# Patient Record
(demographics unavailable — no encounter records)

---

## 2025-04-22 NOTE — HISTORY OF PRESENT ILLNESS
[de-identified] : Initial visit. He presents for what he reports is eustachian tube dysfunction. He begins his history by reporting that in 2022 he was exposed to 8 or 9 hours of very loud music.  He reports that he developed decrease in perceived hearing.  He reports that he had a hearing test at that time which was normal.  The problem finally resolved although he was left with tinnitus.  3 weeks ago he was exposed to loud noise for 15 minutes. He then developed once again the perception of decreased hearing. Was evaluated by an otolaryngologist told that it was safe to fly to [place].  I reviewed an audiogram at that time which showed normal hearing speech discrimination scores and tympanograms.  When he was in [place] he once again felt that his hearing had diminished.  He was seen by an otolaryngologist who sent him for a CAT scan and did not test his hearing. And told him that he had eustachian tube dysfunction giving him decreased hearing. We further told him that he was a candidate for eustachian tube dilation.  He flew home from [place] without a problem. At this point he reports that his hearing is essentially 90% back to normal. - - [FreeTextEntry1] : -  04/22/2025 - He reports he is not feeling better. He wakes up with a clogged right ear. Denies history of bruxism. This is significantly bothersome to his quality of life.  No tinnitus.

## 2025-04-22 NOTE — ASSESSMENT
[FreeTextEntry1] : Had an extensive conversation with the patient that I do not think this is related to his eustachian tube because he has a normal ear exam, normal tympanograms, normal CT scan, and ETD does not come from acoustic trauma and no issues when he flew from [place]. Again I reiterated that he does not need a eustachian tube balloon dilation. We reviewed his CT scan again where he was told there was "fluid in his ear" and my impression is a retention cyst in the right maxillary sinus. Again discussed with him this has no relation to his ear symptoms.   I think this is trauma induced from the noise exposure he had about 5 weeks ago. I believe his symptoms will resolve spontaneously but we discussed a short course of Prednisone in hopes it will calm down some cochlear inflammation from the noise trauma.   the audiogram was ordered by me and interpreted by me today and the results are as follows-  Normal symmetric pure-tone hearing, speech discrimination scores with type A tympanograms.

## 2025-06-04 NOTE — ASSESSMENT
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 Bang reviewed - previous two notes by Brianne Cadena audiology reviewed - audio from 3/26/25 reviewed - audio from 4/22/25 personally reviewed and interpreted - normal hearing bilaterally - patient tried oral steroids already with no improvement - offered R IT steroid injection - patient will return for injection

## 2025-06-04 NOTE — HISTORY OF PRESENT ILLNESS
[de-identified] : 38M who presents with R possible cochlear hydrops since 2022. He had acoustic trauma for 8-9 hours in 2022 resulting in bilateral tinnitus and R aural fullness. He started wearing earplugs everywhere. He has been fine for the past 3 years. He had an exacerbation in March 2025. He saw multiple ENTs and was treated by [facility] with oral steroids. He is still having R aural fullness and it is driving him crazy.

## 2025-06-04 NOTE — DATA REVIEWED
[de-identified] : Audiogram personally reviewed and interpreted and my findings are as follows (4/22/25): Right: SRT 5dB; %; Type A tymp; normal Left: SRT 5dB; %; Type A tymp; normal

## 2025-06-26 NOTE — HISTORY OF PRESENT ILLNESS
[de-identified] : 38M who presents with R possible cochlear hydrops since 2022. He had acoustic trauma for 8-9 hours in 2022 resulting in bilateral tinnitus and R aural fullness. He started wearing earplugs everywhere. He has been fine for the past 3 years. He had an exacerbation in March 2025. He saw multiple ENTs and was treated by [facility] with oral steroids. He is still having R aural fullness and it is driving him crazy.   6/16/25: Went to a loud restaurant 1 week ago and now his left ear is feeling blocked. His right ear is not as bad as when he saw me last but still feels full.   6/26/25: S/p R IT injection on 6/16/25 but still with fluctuating right aural fullness. The left ear was fluctuating as well but is stable today.

## 2025-06-26 NOTE — ASSESSMENT
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 Bang reviewed - previous two notes by Brianne Cadena audiology reviewed - audio from 3/26/25 reviewed - audio from 4/22/25 personally reviewed and interpreted - normal hearing bilaterally - patient tried oral steroids already with no improvement - s/p R IT steroid injection 6/16/25 with no improvement (myringotomy with needle before injection did not improve aural fullness which helps r/o eustachian tube dysfunction) - the patient has had no issues with pressure problems on recent flight either - start dyazide - sent to pharmacy - The potential side effects of dyazide were discussed at length with the patient which include but are not limited to: frequent urination, low blood pressure, dehydration, muscle cramps, fatigue, dizziness, constipation, headaches, erectile dysfunction, blurred vision - discussed with the patient that a diuretic is taken daily to help prevent the occurrence of vertigo attacks - instructed the patient to drink plenty of water (at least 6-8 cups daily) while on a diuretic - continue meniere's diet - second opinion from Dr. Romano as the patient is frustrated that he is not improving despite having tried the meniere's diet, oral steroids, and IT injection  L cochlear hydrops - 1 acute illness - new onset, continue to monitor - stable today

## 2025-06-26 NOTE — DATA REVIEWED
[de-identified] : Audiogram personally reviewed and interpreted and my findings are as follows (4/22/25): Right: SRT 5dB; %; Type A tymp; normal Left: SRT 5dB; %; Type A tymp; normal

## 2025-06-26 NOTE — PHYSICAL EXAM

## 2025-07-21 NOTE — ASSESSMENT
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 Bang reviewed - previous two notes by Brianne Cadena audiology reviewed - audio from 3/26/25 reviewed - audio from 4/22/25 personally reviewed and interpreted - normal hearing bilaterally - patient tried oral steroids already with no improvement - s/p R IT steroid injection 6/16/25 with no improvement (myringotomy with needle before injection did not improve aural fullness which helps r/o eustachian tube dysfunction) - the patient has had no issues with pressure problems on recent flight either - stop dyazide due to possible side effects - continue meniere's diet - will try indapamide 1.25mg daily - The potential side effects of dyazide were discussed at length with the patient which include but are not limited to: frequent urination, low blood pressure, dehydration, muscle cramps, fatigue, dizziness, constipation, headaches, erectile dysfunction, blurred vision - discussed with the patient that a diuretic is taken daily to help prevent the occurrence of vertigo attacks - instructed the patient to drink plenty of water (at least 6-8 cups daily) while on a diuretic  L cochlear hydrops - 1 acute illness - new onset, continue to monitor - stable today  Total time spent on medical discussion: 11-20 minutes

## 2025-07-21 NOTE — HISTORY OF PRESENT ILLNESS
[de-identified] : 38M who presents with R possible cochlear hydrops since 2022. He had acoustic trauma for 8-9 hours in 2022 resulting in bilateral tinnitus and R aural fullness. He started wearing earplugs everywhere. He has been fine for the past 3 years. He had an exacerbation in March 2025. He saw multiple ENTs and was treated by [facility] with oral steroids. He is still having R aural fullness and it is driving him crazy.   6/16/25: Went to a loud restaurant 1 week ago and now his left ear is feeling blocked. His right ear is not as bad as when he saw me last but still feels full.   6/26/25: S/p R IT injection on 6/16/25 but still with fluctuating right aural fullness. The left ear was fluctuating as well but is stable today.   The provider, MATTHEW GUTIERREZ, was located at the medical office located at [address] [place] [address] at the time of the visit. The patient, Osmel Li, was located in their home. The patient and Provider participated in the telephonic visit (audio only) which the patient consented to. 7/19/25 telehealth: chief complaint - R cochlear hydrops. He states that the dyazide did improve his aural fullness but that he started to have side effects such as tingling and numbness in his extremities. He feels some itching in his right ear and a tingling sensation.

## 2025-07-21 NOTE — PHYSICAL EXAM
[TextEntry] : Deferred due to telehealth Previous exam: General: AAO, no significant distress Psychiatric: affect pleasant and within normal limits Eyes: relatively symmetric, no obvious nystagmus Skin: no significant lesions on face Nose: no significant lesions; patent. Oral Cavity & Oropharynx: no significant deformity or lesions Neck/Lymphatics: no significant masses or abnormal cervical nodes palpated Respiratory: breathing comfortably; no significant distress Neurologic: cranial nerves II-XII grossly intact; EOMI Facial function: symmetric   Ear examination performed under binocular otologic microscope: Left Ear External: Pinna and periauricular area is normal. Canal: Ear canal skin is not inflamed or edematous. Tympanic Membrane: Intact and in good position.   Right Ear External: Pinna and periauricular area is normal. Canal: Ear canal skin is not inflamed or edematous. Tympanic Membrane: Intact and in good position.

## 2025-07-21 NOTE — DATA REVIEWED
[de-identified] : Audiogram personally reviewed and interpreted and my findings are as follows (4/22/25): Right: SRT 5dB; %; Type A tymp; normal Left: SRT 5dB; %; Type A tymp; normal